# Patient Record
Sex: MALE | Race: WHITE | Employment: FULL TIME | URBAN - METROPOLITAN AREA
[De-identification: names, ages, dates, MRNs, and addresses within clinical notes are randomized per-mention and may not be internally consistent; named-entity substitution may affect disease eponyms.]

---

## 2019-10-18 ENCOUNTER — HOSPITAL ENCOUNTER (EMERGENCY)
Facility: CLINIC | Age: 29
Discharge: HOME | End: 2019-10-18
Attending: EMERGENCY MEDICINE | Admitting: EMERGENCY MEDICINE

## 2019-10-18 ENCOUNTER — NURSE TRIAGE (OUTPATIENT)
Dept: NURSING | Facility: CLINIC | Age: 29
End: 2019-10-18

## 2019-10-18 VITALS
RESPIRATION RATE: 16 BRPM | TEMPERATURE: 100.1 F | DIASTOLIC BLOOD PRESSURE: 77 MMHG | SYSTOLIC BLOOD PRESSURE: 146 MMHG | HEART RATE: 78 BPM | OXYGEN SATURATION: 95 %

## 2019-10-18 DIAGNOSIS — R79.89 KETONEMIA: ICD-10-CM

## 2019-10-18 DIAGNOSIS — K52.9 GASTROENTERITIS: ICD-10-CM

## 2019-10-18 LAB
ALBUMIN SERPL-MCNC: 4.9 G/DL (ref 3.4–5)
ALBUMIN UR-MCNC: 30 MG/DL
ALP SERPL-CCNC: 50 U/L (ref 40–150)
ALT SERPL W P-5'-P-CCNC: 21 U/L (ref 0–70)
ANION GAP SERPL CALCULATED.3IONS-SCNC: 15 MMOL/L (ref 3–14)
ANISOCYTOSIS BLD QL SMEAR: SLIGHT
APPEARANCE UR: CLEAR
AST SERPL W P-5'-P-CCNC: 17 U/L (ref 0–45)
BASOPHILS # BLD AUTO: 0.1 10E9/L (ref 0–0.2)
BASOPHILS NFR BLD AUTO: 0.9 %
BILIRUB SERPL-MCNC: 0.9 MG/DL (ref 0.2–1.3)
BILIRUB UR QL STRIP: NEGATIVE
BUN SERPL-MCNC: 26 MG/DL (ref 7–30)
C COLI+JEJUNI+LARI FUSA STL QL NAA+PROBE: NOT DETECTED
C DIFF TOX B STL QL: NEGATIVE
CALCIUM SERPL-MCNC: 10.3 MG/DL (ref 8.5–10.1)
CHLORIDE SERPL-SCNC: 107 MMOL/L (ref 94–109)
CO2 BLDCOV-SCNC: 23 MMOL/L (ref 21–28)
CO2 SERPL-SCNC: 17 MMOL/L (ref 20–32)
COLOR UR AUTO: YELLOW
CREAT SERPL-MCNC: 1.24 MG/DL (ref 0.66–1.25)
DIFFERENTIAL METHOD BLD: ABNORMAL
EC STX1 GENE STL QL NAA+PROBE: NOT DETECTED
EC STX2 GENE STL QL NAA+PROBE: NOT DETECTED
ENTERIC PATHOGEN COMMENT: NORMAL
EOSINOPHIL # BLD AUTO: 0 10E9/L (ref 0–0.7)
EOSINOPHIL NFR BLD AUTO: 0 %
ERYTHROCYTE [DISTWIDTH] IN BLOOD BY AUTOMATED COUNT: 12.7 % (ref 10–15)
FLUAV+FLUBV AG SPEC QL: NEGATIVE
FLUAV+FLUBV AG SPEC QL: NEGATIVE
FLUAV+FLUBV RNA SPEC QL NAA+PROBE: NEGATIVE
FLUAV+FLUBV RNA SPEC QL NAA+PROBE: NEGATIVE
GFR SERPL CREATININE-BSD FRML MDRD: 79 ML/MIN/{1.73_M2}
GLUCOSE SERPL-MCNC: 136 MG/DL (ref 70–99)
GLUCOSE UR STRIP-MCNC: NEGATIVE MG/DL
HCT VFR BLD AUTO: 52.4 % (ref 40–53)
HGB BLD-MCNC: 18.1 G/DL (ref 13.3–17.7)
HGB UR QL STRIP: NEGATIVE
KETONES BLD-SCNC: 1.1 MMOL/L (ref 0–0.6)
KETONES UR STRIP-MCNC: >150 MG/DL
LACTATE BLD-SCNC: 1.2 MMOL/L (ref 0.7–2.1)
LEUKOCYTE ESTERASE UR QL STRIP: NEGATIVE
LIPASE SERPL-CCNC: 80 U/L (ref 73–393)
LYMPHOCYTES # BLD AUTO: 1 10E9/L (ref 0.8–5.3)
LYMPHOCYTES NFR BLD AUTO: 6.1 %
MCH RBC QN AUTO: 31.8 PG (ref 26.5–33)
MCHC RBC AUTO-ENTMCNC: 34.5 G/DL (ref 31.5–36.5)
MCV RBC AUTO: 92 FL (ref 78–100)
MONOCYTES # BLD AUTO: 1.3 10E9/L (ref 0–1.3)
MONOCYTES NFR BLD AUTO: 7.8 %
MUCOUS THREADS #/AREA URNS LPF: PRESENT /LPF
NEUTROPHILS # BLD AUTO: 14 10E9/L (ref 1.6–8.3)
NEUTROPHILS NFR BLD AUTO: 85.2 %
NITRATE UR QL: NEGATIVE
NOROV GI+II ORF1-ORF2 JNC STL QL NAA+PR: NOT DETECTED
PCO2 BLDV: 29 MM HG (ref 40–50)
PH BLDV: 7.51 PH (ref 7.32–7.43)
PH UR STRIP: 6 PH (ref 5–7)
PLATELET # BLD AUTO: 271 10E9/L (ref 150–450)
PLATELET # BLD EST: ABNORMAL 10*3/UL
PO2 BLDV: 37 MM HG (ref 25–47)
POTASSIUM SERPL-SCNC: 3.7 MMOL/L (ref 3.4–5.3)
PROT SERPL-MCNC: 8.8 G/DL (ref 6.8–8.8)
RBC # BLD AUTO: 5.69 10E12/L (ref 4.4–5.9)
RBC #/AREA URNS AUTO: <1 /HPF (ref 0–2)
RSV RNA SPEC NAA+PROBE: NEGATIVE
RVA NSP5 STL QL NAA+PROBE: NOT DETECTED
SALMONELLA SP RPOD STL QL NAA+PROBE: NOT DETECTED
SAO2 % BLDV FROM PO2: 77 %
SHIGELLA SP+EIEC IPAH STL QL NAA+PROBE: NOT DETECTED
SODIUM SERPL-SCNC: 140 MMOL/L (ref 133–144)
SOURCE: ABNORMAL
SP GR UR STRIP: 1.03 (ref 1–1.03)
SPECIMEN SOURCE: NORMAL
SQUAMOUS #/AREA URNS AUTO: <1 /HPF (ref 0–1)
UROBILINOGEN UR STRIP-MCNC: NORMAL MG/DL (ref 0–2)
V CHOL+PARA RFBL+TRKH+TNAA STL QL NAA+PR: NOT DETECTED
WBC # BLD AUTO: 16.4 10E9/L (ref 4–11)
WBC #/AREA URNS AUTO: 2 /HPF (ref 0–5)
Y ENTERO RECN STL QL NAA+PROBE: NOT DETECTED

## 2019-10-18 PROCEDURE — 96374 THER/PROPH/DIAG INJ IV PUSH: CPT | Performed by: EMERGENCY MEDICINE

## 2019-10-18 PROCEDURE — 25800030 ZZH RX IP 258 OP 636: Performed by: EMERGENCY MEDICINE

## 2019-10-18 PROCEDURE — 85025 COMPLETE CBC W/AUTO DIFF WBC: CPT | Performed by: EMERGENCY MEDICINE

## 2019-10-18 PROCEDURE — 87804 INFLUENZA ASSAY W/OPTIC: CPT | Performed by: EMERGENCY MEDICINE

## 2019-10-18 PROCEDURE — 87493 C DIFF AMPLIFIED PROBE: CPT | Performed by: EMERGENCY MEDICINE

## 2019-10-18 PROCEDURE — 96361 HYDRATE IV INFUSION ADD-ON: CPT | Performed by: EMERGENCY MEDICINE

## 2019-10-18 PROCEDURE — 87506 IADNA-DNA/RNA PROBE TQ 6-11: CPT | Performed by: EMERGENCY MEDICINE

## 2019-10-18 PROCEDURE — 83690 ASSAY OF LIPASE: CPT | Performed by: EMERGENCY MEDICINE

## 2019-10-18 PROCEDURE — 25000128 H RX IP 250 OP 636: Performed by: EMERGENCY MEDICINE

## 2019-10-18 PROCEDURE — 83605 ASSAY OF LACTIC ACID: CPT

## 2019-10-18 PROCEDURE — 99284 EMERGENCY DEPT VISIT MOD MDM: CPT | Mod: 25 | Performed by: EMERGENCY MEDICINE

## 2019-10-18 PROCEDURE — 82803 BLOOD GASES ANY COMBINATION: CPT

## 2019-10-18 PROCEDURE — 80053 COMPREHEN METABOLIC PANEL: CPT | Performed by: EMERGENCY MEDICINE

## 2019-10-18 PROCEDURE — 82010 KETONE BODYS QUAN: CPT | Performed by: EMERGENCY MEDICINE

## 2019-10-18 PROCEDURE — 87631 RESP VIRUS 3-5 TARGETS: CPT | Performed by: EMERGENCY MEDICINE

## 2019-10-18 PROCEDURE — 99284 EMERGENCY DEPT VISIT MOD MDM: CPT | Mod: Z6 | Performed by: EMERGENCY MEDICINE

## 2019-10-18 PROCEDURE — 81001 URINALYSIS AUTO W/SCOPE: CPT | Performed by: EMERGENCY MEDICINE

## 2019-10-18 RX ORDER — ONDANSETRON 2 MG/ML
4 INJECTION INTRAMUSCULAR; INTRAVENOUS EVERY 30 MIN PRN
Status: DISCONTINUED | OUTPATIENT
Start: 2019-10-18 | End: 2019-10-18 | Stop reason: HOSPADM

## 2019-10-18 RX ORDER — ONDANSETRON 4 MG/1
4 TABLET, ORALLY DISINTEGRATING ORAL EVERY 6 HOURS PRN
Qty: 10 TABLET | Refills: 0 | Status: SHIPPED | OUTPATIENT
Start: 2019-10-18 | End: 2019-10-25

## 2019-10-18 RX ORDER — ACETAMINOPHEN 325 MG/1
975 TABLET ORAL ONCE
Status: DISCONTINUED | OUTPATIENT
Start: 2019-10-18 | End: 2019-10-18 | Stop reason: HOSPADM

## 2019-10-18 RX ADMIN — ONDANSETRON HYDROCHLORIDE 4 MG: 2 INJECTION, SOLUTION INTRAMUSCULAR; INTRAVENOUS at 08:48

## 2019-10-18 RX ADMIN — SODIUM CHLORIDE 1000 ML: 9 INJECTION, SOLUTION INTRAVENOUS at 08:35

## 2019-10-18 RX ADMIN — SODIUM CHLORIDE 1000 ML: 9 INJECTION, SOLUTION INTRAVENOUS at 09:54

## 2019-10-18 RX ADMIN — DEXTROSE AND SODIUM CHLORIDE: 5; 900 INJECTION, SOLUTION INTRAVENOUS at 11:40

## 2019-10-18 ASSESSMENT — ENCOUNTER SYMPTOMS
ABDOMINAL PAIN: 0
VOMITING: 1
HEADACHES: 0
COUGH: 0
NAUSEA: 1
DYSURIA: 0
SHORTNESS OF BREATH: 0
DIARRHEA: 1
CONSTIPATION: 0
FEVER: 0
SORE THROAT: 0

## 2019-10-18 NOTE — DISCHARGE INSTRUCTIONS
Take over-the-counter Tylenol, ibuprofen for abdominal pain and fever.      Drink plenty of fluids to maintain hydration.  You should drink water and an electrolyte beverage (such as Powerade, Pedialyte, or Gatorade).      Limit food intake to bland, clear liquids (such as chicken or vegetable broth) until your symptoms improve.  You can advance your diet, as tolerated.      You may take over-the-counter Imodium to help with diarrhea symptoms.  However, this can make some bacterial infections worse and you should discontinue if your symptoms progress.    You may also take Bentyl for abdominal cramping and Zofran for nausea, if you were prescribed these medications.    Seek evaluation in the emergency department if you develop worsening pain, dehydration, blood in your stool or vomit.

## 2019-10-18 NOTE — ED AVS SNAPSHOT
[facility], [place], Emergency Department  [address] [address]  Phone:  [phone]                                    Aftab Perez   MRN: [id number]    Department:  [facility], Emergency Department   Date of Visit:  10/18/2019           After Visit Summary Signature Page    I have received my discharge instructions, and my questions have been answered. I have discussed any challenges I see with this plan with the nurse or doctor.    ..........................................................................................................................................  Patient/Patient Representative Signature      ..........................................................................................................................................  Patient Representative Print Name and Relationship to Patient    ..................................................               ................................................  Date                                   Time    ..........................................................................................................................................  Reviewed by Signature/Title    ...................................................              ..............................................  Date                                               Time          22EPIC Rev 08/18

## 2019-10-18 NOTE — ED PROVIDER NOTES
"      [facility] ([facility])  October 18, 2019    History     Chief Complaint   Patient presents with     Nausea, Vomiting, & Diarrhea     HPI  Aftab Perez is a 29 year old male who is otherwise healthy and presents to the ED with new onset of nausea, vomiting, and diarrhea that started yesterday.  Patient states that he woke up at 5 AM yesterday and started to vomit.  He states that since then he has not been able to tolerate p.o. intake including fluids or food.  He also complains of some \"hunger pains\" (but no real abdominal pain). Reports 6 episodes of diarrhea since midnight that was small volume. Has had diaphoresis, and decreased urine output.  He otherwise denies recent sick contact, recent travel, consumption of undercooked or spoiled food, taking medications on a regular basis, recent antibiotics, recent hospitalizations, dysuria, previous abdominal surgeries, cough, headache, chest pain, shortness of breath, sore throat, or other medical problems.    PAST MEDICAL HISTORY  History reviewed. No pertinent past medical history.  PAST SURGICAL HISTORY  History reviewed. No pertinent surgical history.  FAMILY HISTORY  History reviewed. No pertinent family history.  SOCIAL HISTORY  Social History     Tobacco Use     Smoking status: Current Every Day Smoker     Packs/day: 0.50     Smokeless tobacco: Current User   Substance Use Topics     Alcohol use: Yes     Comment: 5 times weekly      MEDICATIONS  Current Facility-Administered Medications   Medication     acetaminophen (TYLENOL) tablet 975 mg     ondansetron (ZOFRAN) injection 4 mg     Current Outpatient Medications   Medication     ondansetron (ZOFRAN ODT) 4 MG ODT tab     ALLERGIES  Allergies   Allergen Reactions     Bactrim [Sulfamethoxazole W/Trimethoprim]      Not sure of rx       I have reviewed the Medications, Allergies, Past Medical and Surgical History, and Social History in the Epic system.    Review of Systems "   Constitutional: Negative for fever.   HENT: Negative for sore throat.    Respiratory: Negative for cough and shortness of breath.    Cardiovascular: Negative for chest pain.   Gastrointestinal: Positive for diarrhea, nausea and vomiting. Negative for abdominal pain and constipation.   Genitourinary: Positive for decreased urine volume. Negative for dysuria.   Allergic/Immunologic: Negative for immunocompromised state.   Neurological: Negative for headaches.   All other systems reviewed and are negative.      Physical Exam   BP: 131/89  Pulse: 111  Temp: 100.1  F (37.8  C)  Resp: 20  SpO2: 96 %      Physical Exam  Vitals signs and nursing note reviewed.   Constitutional:       General: He is not in acute distress.     Appearance: He is well-developed and normal weight. He is ill-appearing. He is not toxic-appearing or diaphoretic.   HENT:      Head: Normocephalic and atraumatic.      Nose: Nose normal. No congestion or rhinorrhea.      Mouth/Throat:      Mouth: Mucous membranes are dry.   Eyes:      General: No scleral icterus.     Conjunctiva/sclera: Conjunctivae normal.   Neck:      Musculoskeletal: Normal range of motion and neck supple. No neck rigidity.   Cardiovascular:      Rate and Rhythm: Regular rhythm. Tachycardia present.      Heart sounds: Normal heart sounds. No murmur.   Pulmonary:      Effort: Pulmonary effort is normal. No respiratory distress.      Breath sounds: Normal breath sounds. No stridor. No wheezing, rhonchi or rales.   Abdominal:      General: Abdomen is flat. There is no distension.      Palpations: Abdomen is soft. There is no mass.      Tenderness: There is no tenderness. There is no guarding or rebound. Negative signs include Sellers's sign and McBurney's sign.   Musculoskeletal: Normal range of motion.         General: No deformity or signs of injury.   Skin:     General: Skin is warm and dry.      Coloration: Skin is not jaundiced or pale.      Findings: No rash.   Neurological:       General: No focal deficit present.      Mental Status: He is alert and oriented to person, place, and time.   Psychiatric:         Mood and Affect: Mood normal.         Behavior: Behavior normal.         Thought Content: Thought content normal.         ED Course        Procedures   8:33 AM  The patient was seen and examined by Dr. Valladares Room 33.                Critical Care time:  none             Labs Ordered and Resulted from Time of ED Arrival Up to the Time of Departure from the ED   CBC WITH PLATELETS DIFFERENTIAL - Abnormal; Notable for the following components:       Result Value    WBC 16.4 (*)     Hemoglobin 18.1 (*)     Absolute Neutrophil 14.0 (*)     All other components within normal limits   COMPREHENSIVE METABOLIC PANEL - Abnormal; Notable for the following components:    Carbon Dioxide 17 (*)     Anion Gap 15 (*)     Glucose 136 (*)     Calcium 10.3 (*)     All other components within normal limits   ROUTINE UA WITH MICROSCOPIC REFLEX TO CULTURE - Abnormal; Notable for the following components:    Ketones Urine >150 (*)     Protein Albumin Urine 30 (*)     Mucous Urine Present (*)     All other components within normal limits   KETONE BETA-HYDROXYBUTYRATE QUANTITATIVE - Abnormal; Notable for the following components:    Ketone Quantitative 1.1 (*)     All other components within normal limits   ISTAT  GASES LACTATE RANJEET POCT - Abnormal; Notable for the following components:    Ph Venous 7.51 (*)     PCO2 Venous 29 (*)     All other components within normal limits   LIPASE   PERIPHERAL IV CATHETER   ISTAT CG4 GASES LACTATE RANJEET NURSING POCT   PATIENT CARE ORDER   INFLUENZA A/B ANTIGEN   CLOSTRIDIUM DIFFICILE TOXIN B   ENTERIC BACTERIA AND VIRUS PANEL BY ANGIE STOOL   INFLUENZA A AND B AND RSV PCR            Assessments & Plan (with Medical Decision Making)   Aftab Perez is a 29 year old male who is otherwise healthy and presents to the ED with new onset of nausea, vomiting, and diarrhea that started  yesterday.     Ddx: viral gastroenteritis, [place], dehydration    Giving IVF, zofran. abd nontender so defer imaging. Appears very dehydrated.      Patient's labs notable for normal electrolytes and creatinine.  Anion gap 15 glucose 136, bicarb 17.  White count 16.4 and hemoglobin 18.1.  Consistent with hemoconcentration.  Patient has been afebrile at home and has a temp of 100.1 while he is here but he does not feel hot.  The low suspicion for bacterial gastroenteritis or colitis given patient's lack of risk factors and exposures.  However stool sample was sent.  Flu negative.  Normal LFTs and lipase.     Patient was tachycardic and mildly tachypneic on arrival.  His blood gas showed a respiratory alkalosis with a normal lactate.  Given patient's high anion gap and ketones were added on and resulted 1.1.  I suspect this is a result of starvation ketosis.  Patient given a total of 3 L of IV fluid.  1 L was given with D5 for correction of the ketosis.  Patient even 1 dose of Zofran with improvement of nausea and was able to tolerate p.o. without recurrent nausea or vomiting.  Repeat vitals show resolution of tachycardia and tachypnea.  Patient did produce a urine sample after receiving fluids.  This showed large amount of urine ketones and albumin but was not consistent with an infection.  Also no glucose in the urine.  On reevaluation, patient symptomatically improved and appears dramatically better on exam.  We discussed that he would receive a call if his stool cultures resulted abnormally requiring a prescription for antibiotics.  I also encouraged patient to continue pushing oral fluids and electrolyte supplements as well as chicken broth at home.  Given prescription for Zofran.  I also informed patient of his fairly dramatic ketonemia.  I do suspect this is just related to patient's poor p.o. intake.  However, I did caution patient to return to the emergency department if he cannot tolerate oral hydration at home  or if he starts to feel ill again.  Patient verbalized understanding and felt comfortable with discharge home.    I have reviewed the nursing notes.    I have reviewed the findings, diagnosis, plan and need for follow up with the patient.    New Prescriptions    ONDANSETRON (ZOFRAN ODT) 4 MG ODT TAB    Take 1 tablet (4 mg) by mouth every 6 hours as needed for nausea       Final diagnoses:   Gastroenteritis   Ketonemia      ITimothy, am serving as a trained medical scribe to document services personally performed by Maddie Valladares MD, based on the provider's statements to me.      IMaddie MD, was physically present and have reviewed and verified the accuracy of this note documented by Timothy Clemens.    10/18/2019   [facility], [place], EMERGENCY DEPARTMENT     Maddie Valladares MD  10/18/19 [id number]

## 2019-10-18 NOTE — TELEPHONE ENCOUNTER
"Patient calling reporting he has not been able to hold anything down in the past 24 hours. Reporting vomiting and diarrhea every 30-45 minutes. Afebrile. Voiding small amounts of urine. Dizzy.   Patient denies any medical history.   Advised per guidelines to be seen with in 24 hours. Caller verbalized understanding. Denies further questions.    Stating he has transportation to the Emergency Room.    Marilin Montes RN  [facility]      Reason for Disposition    [1] SEVERE vomiting (e.g., 6 or more times/day) AND [2] present > 8 hours    Additional Information    Negative: Shock suspected (e.g., cold/pale/clammy skin, too weak to stand, low BP, rapid pulse)    Negative: Difficult to awaken or acting confused (e.g., disoriented, slurred speech)    Negative: Sounds like a life-threatening emergency to the triager    Negative: [1] Vomiting AND [2] contains red blood or black (\"coffee ground\") material  (Exception: few red streaks in vomit that only happened once)    Negative: Severe pain in one eye    Negative: Recent head injury (within last 3 days)    Negative: Recent abdominal injury (within last 3 days)    Negative: [1] Insulin-dependent diabetes (Type I) AND [2] glucose > 400 mg/dl (22 mmol/l)    Protocols used: VOMITING-A-AH      "

## 2019-10-18 NOTE — ED TRIAGE NOTES
Presented to the ED with c/o of malaise and N/V/D that started yesterday morning at 5 AM. Reports he is unable to keep anything down. Having chills and nights sweats but denies fever. Believes he is dehydrated d/t  Feeling thirsty and voiding small about urine.

## 2019-10-19 NOTE — RESULT ENCOUNTER NOTE
Final Enteric Bacteria and Virus Panel by ANGIE Stool is NEGATIVE for Campylobacter group, Salmonella species, Shigella species, Shiga toxin 1 gene, Shiga toxin 2 gene, Vibrio group,  Yersinia enterocolitica,  Rotavirus A,  and Norovirus I and II.    No treatment or change in treatment per [facility] Lab Result protocol.